# Patient Record
Sex: FEMALE | Race: WHITE | NOT HISPANIC OR LATINO | Employment: FULL TIME | URBAN - METROPOLITAN AREA
[De-identification: names, ages, dates, MRNs, and addresses within clinical notes are randomized per-mention and may not be internally consistent; named-entity substitution may affect disease eponyms.]

---

## 2017-09-14 ENCOUNTER — OFFICE VISIT (OUTPATIENT)
Dept: FAMILY MEDICINE | Facility: CLINIC | Age: 22
End: 2017-09-14
Payer: COMMERCIAL

## 2017-09-14 VITALS
TEMPERATURE: 98.2 F | BODY MASS INDEX: 20.24 KG/M2 | OXYGEN SATURATION: 100 % | HEART RATE: 60 BPM | SYSTOLIC BLOOD PRESSURE: 101 MMHG | DIASTOLIC BLOOD PRESSURE: 55 MMHG | HEIGHT: 62 IN | WEIGHT: 110 LBS

## 2017-09-14 DIAGNOSIS — Z30.40 ENCOUNTER FOR SURVEILLANCE OF CONTRACEPTIVES, UNSPECIFIED CONTRACEPTIVE: Primary | ICD-10-CM

## 2017-09-14 PROCEDURE — 99203 OFFICE O/P NEW LOW 30 MIN: CPT | Performed by: INTERNAL MEDICINE

## 2017-09-14 RX ORDER — DROSPIRENONE AND ETHINYL ESTRADIOL 0.03MG-3MG
1 KIT ORAL DAILY
Qty: 30 TABLET | Refills: 11 | Status: SHIPPED | OUTPATIENT
Start: 2017-09-14 | End: 2018-02-10

## 2017-09-14 RX ORDER — DROSPIRENONE AND ETHINYL ESTRADIOL 0.03MG-3MG
1 KIT ORAL DAILY
COMMUNITY
End: 2017-09-14

## 2017-09-14 NOTE — MR AVS SNAPSHOT
After Visit Summary   9/14/2017    Rosario Loera    MRN: [id number]           Patient Information     Date Of Birth          1995        Visit Information        Provider Department      9/14/2017 6:30 PM Chantelle Torres MD [facility]        Today's Diagnoses     Encounter for surveillance of contraceptives, unspecified contraceptive    -  1       Follow-ups after your visit        Additional Services     OB/GYN REFERRAL       Your provider has referred you to:  G: [facility] [phone]  [email or web address]    Please be aware that coverage of these services is subject to the terms and limitations of your health insurance plan.  Call member services at your health plan with any benefit or coverage questions.      Please bring the following with you to your appointment:    (1) Any X-Rays, CTs or MRIs which have been performed.  Contact the facility where they were done to arrange for  prior to your scheduled appointment.   (2) List of current medications   (3) This referral request   (4) Any documents/labs given to you for this referral                  Who to contact     If you have questions or need follow up information about today's clinic visit or your schedule please contact [facility] directly at [phone].  Normal or non-critical lab and imaging results will be communicated to you by MyChart, letter or phone within 4 business days after the clinic has received the results. If you do not hear from us within 7 days, please contact the clinic through MyChart or phone. If you have a critical or abnormal lab result, we will notify you by phone as soon as possible.  Submit refill requests through [employer] or call your pharmacy and they will forward the refill request to us. Please allow 3 business days for your refill to be completed.          Additional Information About Your Visit       "  MyChart Information     [employer] lets you send messages to your doctor, view your test results, renew your prescriptions, schedule appointments and more. To sign up, go to [email or web address] . Click on \"Log in\" on the left side of the screen, which will take you to the Welcome page. Then click on \"Sign up Now\" on the right side of the page.     You will be asked to enter the access code listed below, as well as some personal information. Please follow the directions to create your username and password.     Your access code is: [email or web address]  Expires: 2017  6:38 PM     Your access code will  in 90 days. If you need help or a new code, please call your [facility] clinic or [phone].        Care EveryWhere ID     This is your [facility] EveryWhere ID. This could be used by other organizations to access your [facility] medical records  [id number]        Your Vitals Were     Pulse Temperature Height Pulse Oximetry Breastfeeding? BMI (Body Mass Index)    60 98.2  F (36.8  C) (Tympanic) 5' 1.5\" (1.562 m) 100% No 20.45 kg/m2       Blood Pressure from Last 3 Encounters:   17 101/55    Weight from Last 3 Encounters:   17 110 lb (49.9 kg)              We Performed the Following     OB/GYN REFERRAL          Where to get your medicines      These medications were sent to [facility] [address] [place] [address] [address], [place] [address]     Phone:  [phone]     drospirenone-ethinyl estradiol 3-0.03 MG per tablet          Primary Care Provider Office Phone # Fax #    Chantelle Celestino Torres [phone]       [facility] [address] [address]        Equal Access to Services     KERA CAUSEY AH: Madie Henry, waaxda luqadaha, qaybta kaalperez dorsey, randy moreland. [facility] [phone].    ATENCIÓN: Si habla español, tiene a hearn disposición " servicios gratuitos de asistencia lingüística. Justice kendrick [phone].    We comply with applicable federal civil rights laws and [place] laws. We do not discriminate on the basis of race, color, national origin, age, disability sex, sexual orientation or gender identity.            Thank you!     Thank you for choosing [facility]  for your care. Our goal is always to provide you with excellent care. Hearing back from our patients is one way we can continue to improve our services. Please take a few minutes to complete the written survey that you may receive in the mail after your visit with us. Thank you!             Your Updated Medication List - Protect others around you: Learn how to safely use, store and throw away your medicines at www.disposemymeds.org.          This list is accurate as of: 9/14/17  6:38 PM.  Always use your most recent med list.                   Brand Name Dispense Instructions for use Diagnosis    drospirenone-ethinyl estradiol 3-0.03 MG per tablet    KRISTINA    30 tablet    Take 1 tablet by mouth daily    Encounter for surveillance of contraceptives, unspecified contraceptive       [facility] [employer] VITAMINS PO

## 2017-09-14 NOTE — PROGRESS NOTES
"Chief Complaint:     Refill of Contraceptive pills    HPI:   Patient Rosario Loera is a 21 year old female with no significant past medical history who presents to Internal Medicine clinic today for refill of contraceptive pills. Regarding the patient's routine health maintenance, the patient is also interested in a referral to establish care with outpatient ob/gyn clinic going forward.      Current Medications:     Current Outpatient Prescriptions   Medication Sig Dispense Refill     Pediatric Multivit-Minerals-C ([employer] BE[employer] VITAMINS PO)        drospirenone-ethinyl estradiol (KRISTINA) 3-0.03 MG per tablet Take 1 tablet by mouth daily 30 tablet 11     [DISCONTINUED] drospirenone-ethinyl estradiol (KRISTINA) 3-0.03 MG per tablet Take 1 tablet by mouth daily           Allergies:    No Known Allergies         Past Medical History:   No past medical history on file.      Past Surgical History:   No past surgical history on file.      Family Medical History:     Family History   Problem Relation Age of Onset     Family History Negative Mother      Family History Negative Father          Social History:     Social History     Social History     Marital status: Single     Spouse name: N/A     Number of children: N/A     Years of education: N/A     Occupational History     Not on file.     Social History Main Topics     Smoking status: Never Smoker     Smokeless tobacco: Never Used     Alcohol use Yes     Drug use: No     Sexual activity: Yes     Partners: Male     Birth control/ protection: Pill     Other Topics Concern     Not on file     Social History Narrative     No narrative on file           Review of System:      ROS: 10 point ROS neg other than the symptoms noted above in the HPI.        Physical Exam:   /55 (BP Location: Right arm, Cuff Size: Adult Small)  Pulse 60  Temp 98.2  F (36.8  C) (Tympanic)  Ht 5' 1.5\" (1.562 m)  Wt 110 lb (49.9 kg)  SpO2 100%  Breastfeeding? No  BMI 20.45 " kg/m2    GENERAL: healthy, alert and no distress  HENT: Normocephalic atraumatic.   MS: no gross musculoskeletal defects noted  NEURO: Alert & Oriented x 3.   PSYCH: mentation appears normal, affect normal        ASSESSMENT/PLAN:       (Z30.40) Encounter for surveillance of contraceptives, unspecified contraceptive  (primary encounter diagnosis)  Comment: Patient is requesting refill of contraceptive pills. Regarding the patient's routine health maintenance, the patient is also interested in a referral to establish care with outpatient ob/gyn clinic going forward.  Plan: I have ordered refill of the drospirenone-ethinyl estradiol (KRISTINA) 3-0.03 MG per tablet contraceptive pills, and OB/GYN REFERRAL to establish care with outpatient ob/gyn clinic going forward.        Follow Up Plan:     Patient is instructed to return to Internal Medicine clinic for follow-up visit on an as-needed basis.        Chantelle Torres MD  Internal Medicine  [facility]

## 2017-09-14 NOTE — NURSING NOTE
"No chief complaint on file.      Initial /55 (BP Location: Right arm, Cuff Size: Adult Small)  Pulse 60  Temp 98.2  F (36.8  C) (Tympanic)  Ht 5' 1.5\" (1.562 m)  Wt 110 lb (49.9 kg)  SpO2 100%  Breastfeeding? No  BMI 20.45 kg/m2 Estimated body mass index is 20.45 kg/(m^2) as calculated from the following:    Height as of this encounter: 5' 1.5\" (1.562 m).    Weight as of this encounter: 110 lb (49.9 kg).  Medication Reconciliation: complete  Roxy Moses MA    "

## 2017-12-07 ENCOUNTER — OFFICE VISIT (OUTPATIENT)
Dept: FAMILY MEDICINE | Facility: CLINIC | Age: 22
End: 2017-12-07
Payer: COMMERCIAL

## 2017-12-07 VITALS
TEMPERATURE: 97 F | BODY MASS INDEX: 21.09 KG/M2 | HEART RATE: 58 BPM | HEIGHT: 61 IN | OXYGEN SATURATION: 98 % | WEIGHT: 111.7 LBS | DIASTOLIC BLOOD PRESSURE: 68 MMHG | SYSTOLIC BLOOD PRESSURE: 106 MMHG

## 2017-12-07 DIAGNOSIS — Z30.02 ENCOUNTER FOR COUNSELING AND INSTRUCTION IN NATURAL FAMILY PLANNING TO AVOID PREGNANCY: Primary | ICD-10-CM

## 2017-12-07 PROCEDURE — 99214 OFFICE O/P EST MOD 30 MIN: CPT | Performed by: INTERNAL MEDICINE

## 2017-12-07 NOTE — MR AVS SNAPSHOT
"              After Visit Summary   2017    Rosario Loera    MRN: [id number]           Patient Information     Date Of Birth          1995        Visit Information        Provider Department      2017 6:00 PM Cornell Azar MD [facility]        Care Instructions    Read more about natural contraceptive methods at as Billing's Method, Withdrawal Method, Basal Temperature Method.    Follow up with OB-GYN once you start to have definite plan for conception.          Follow-ups after your visit        Who to contact     If you have questions or need follow up information about today's clinic visit or your schedule please contact [facility] directly at [phone].  Normal or non-critical lab and imaging results will be communicated to you by MyChart, letter or phone within 4 business days after the clinic has received the results. If you do not hear from us within 7 days, please contact the clinic through MyChart or phone. If you have a critical or abnormal lab result, we will notify you by phone as soon as possible.  Submit refill requests through [employer] or call your pharmacy and they will forward the refill request to us. Please allow 3 business days for your refill to be completed.          Additional Information About Your Visit        MyChart Information     [employer] lets you send messages to your doctor, view your test results, renew your prescriptions, schedule appointments and more. To sign up, go to [email or web address] . Click on \"Log in\" on the left side of the screen, which will take you to the Welcome page. Then click on \"Sign up Now\" on the right side of the page.     You will be asked to enter the access code listed below, as well as some personal information. Please follow the directions to create your username and password.     Your access code is: [email or web address]  Expires: 2017  5:38 PM     Your access code will  in 90 days. If " "you need help or a new code, please call your [facility] clinic or [phone].        Care EveryWhere ID     This is your Care EveryWhere ID. This could be used by other organizations to access your [facility] medical records  [id number]        Your Vitals Were     Pulse Temperature Height Pulse Oximetry BMI (Body Mass Index)       58 97  F (36.1  C) (Oral) 5' 1\" (1.549 m) 98% 21.11 kg/m2        Blood Pressure from Last 3 Encounters:   12/07/17 106/68   09/14/17 101/55    Weight from Last 3 Encounters:   12/07/17 111 lb 11.2 oz (50.7 kg)   09/14/17 110 lb (49.9 kg)              Today, you had the following     No orders found for display       Primary Care Provider Fax #    Physician No Ref-Primary [phone]       No address on file        Equal Access to Services     : Hadrosanna Henry, doroteo amezcua, amy realalmadoron dorsey, randy lamb . So [facility] [phone].    ATENCIÓN: Si habla español, tiene a hearn disposición servicios gratuitos de asistencia lingüística. Shandaame al [phone].    We comply with applicable federal civil rights laws and [place] laws. We do not discriminate on the basis of race, color, national origin, age, disability, sex, sexual orientation, or gender identity.            Thank you!     Thank you for choosing [facility]  for your care. Our goal is always to provide you with excellent care. Hearing back from our patients is one way we can continue to improve our services. Please take a few minutes to complete the written survey that you may receive in the mail after your visit with us. Thank you!             Your Updated Medication List - Protect others around you: Learn how to safely use, store and throw away your medicines at www.disposemymeds.org.          This list is accurate as of: 12/7/17  6:35 PM.  Always use your most recent med list.                   Brand Name Dispense Instructions for use Diagnosis    " drospirenone-ethinyl estradiol 3-0.03 MG per tablet    KRISTINA    30 tablet    Take 1 tablet by mouth daily    Encounter for surveillance of contraceptives, unspecified contraceptive       [employer] VITAMINS PO

## 2017-12-08 NOTE — PROGRESS NOTES
"HPI    SUBJECTIVE:   Rosario Loera is a 22 year old female who presents to clinic today for the following health issues:    Patient is interested in discussing methods for contraception.  She prefers to learn more about natural methods.      History reviewed. No pertinent past medical history.      Review of Systems   Constitutional: Negative for malaise/fatigue and weight loss.   Gastrointestinal: Negative for abdominal pain, nausea and vomiting.       /68 (BP Location: Left arm, Patient Position: Chair, Cuff Size: Adult Regular)  Pulse 58  Temp 97  F (36.1  C) (Oral)  Ht 5' 1\" (1.549 m)  Wt 111 lb 11.2 oz (50.7 kg)  SpO2 98%  BMI 21.11 kg/m2      Physical Exam   Constitutional: She is oriented to person, place, and time. No distress.   Neck: No thyromegaly present.   Abdominal: Soft. She exhibits no distension. There is no tenderness.   Neurological: She is alert and oriented to person, place, and time. GCS score is 15.   Psychiatric: Mood and affect normal.   Vitals reviewed.        ICD-10-CM    1. Encounter for counseling and instruction in natural family planning to avoid pregnancy Z30.02      **please refer to HPI for status of conditions      Patient Instructions   Read more about natural contraceptive methods at as Billing's Method, Withdrawal Method, Basal Temperature Method.    Follow up with OB-GYN once you start to have definite plan for conception.      *25 minutes was spent with the patient, more than half of which was spent on counseling on natural contraceptive methods    "

## 2017-12-08 NOTE — NURSING NOTE
"Chief Complaint   Patient presents with     Establish Care       Initial /68 (BP Location: Left arm, Patient Position: Chair, Cuff Size: Adult Regular)  Pulse 58  Temp 97  F (36.1  C) (Oral)  Ht 5' 1\" (1.549 m)  Wt 111 lb 11.2 oz (50.7 kg)  SpO2 98%  BMI 21.11 kg/m2 Estimated body mass index is 21.11 kg/(m^2) as calculated from the following:    Height as of this encounter: 5' 1\" (1.549 m).    Weight as of this encounter: 111 lb 11.2 oz (50.7 kg).  Medication Reconciliation: complete     Gi Cortez MA   "

## 2017-12-08 NOTE — PATIENT INSTRUCTIONS
Read more about natural contraceptive methods at as Billing's Method, Withdrawal Method, Basal Temperature Method.    Follow up with OB-GYN once you start to have definite plan for conception.

## 2017-12-29 ASSESSMENT — ENCOUNTER SYMPTOMS
NAUSEA: 0
WEIGHT LOSS: 0
VOMITING: 0
ABDOMINAL PAIN: 0

## 2018-02-10 DIAGNOSIS — Z30.40 ENCOUNTER FOR SURVEILLANCE OF CONTRACEPTIVES, UNSPECIFIED CONTRACEPTIVE: ICD-10-CM

## 2018-02-10 NOTE — TELEPHONE ENCOUNTER
"requesting 90 day supply    Requested Prescriptions   Pending Prescriptions Disp Refills     drospirenone-ethinyl estradiol (KRISTINA) 3-0.03 MG per tablet  Last Written Prescription Date:  9/14/17  Last Fill Quantity: 30 tablet,  # refills: 11   Last office visit: 12/7/2017 with prescribing provider:  Doe 9/14/17 Melissa   Future Office Visit:   84 tablet 2     Sig: Take 1 tablet by mouth daily    Contraceptives Protocol Passed    2/10/2018  9:45 AM       Passed - Patient is not a current smoker if age is 35 or older       Passed - Recent or future visit with authorizing provider's specialty    Patient had office visit in the last year or has a visit in the next 30 days with authorizing provider.  See \"Patient Info\" tab in inbasket, or \"Choose Columns\" in Meds & Orders section of the refill encounter.            Passed - No active pregnancy on record       Passed - No positive pregnancy test in past 12 months          "

## 2018-02-13 RX ORDER — DROSPIRENONE AND ETHINYL ESTRADIOL 0.03MG-3MG
1 KIT ORAL DAILY
Qty: 84 TABLET | Refills: 1 | Status: SHIPPED | OUTPATIENT
Start: 2018-02-13 | End: 2018-10-15

## 2018-10-15 DIAGNOSIS — Z30.40 ENCOUNTER FOR SURVEILLANCE OF CONTRACEPTIVES, UNSPECIFIED CONTRACEPTIVE: ICD-10-CM

## 2018-10-16 RX ORDER — DROSPIRENONE AND ETHINYL ESTRADIOL 0.03MG-3MG
1 KIT ORAL DAILY
Qty: 84 TABLET | Refills: 0 | Status: SHIPPED | OUTPATIENT
Start: 2018-10-16 | End: 2019-01-11

## 2018-10-16 NOTE — TELEPHONE ENCOUNTER
"Last Written Prescription Date:  2/13/18  Last Fill Quantity: 84 tablet,  # refills: 1   Last office visit: 12/7/2017 with prescribing provider:  Doe   Future Office Visit:      Requested Prescriptions   Pending Prescriptions Disp Refills     drospirenone-ethinyl estradiol (KRISTINA) 3-0.03 MG per tablet 84 tablet 1     Sig: Take 1 tablet by mouth daily    Contraceptives Protocol Passed    10/15/2018  6:22 PM       Passed - Patient is not a current smoker if age is 35 or older       Passed - Recent (12 mo) or future (30 days) visit within the authorizing provider's specialty    Patient had office visit in the last 12 months or has a visit in the next 30 days with authorizing provider or within the authorizing provider's specialty.  See \"Patient Info\" tab in inbasket, or \"Choose Columns\" in Meds & Orders section of the refill encounter.           Passed - No active pregnancy on record       Passed - No positive pregnancy test in past 12 months          "

## 2018-10-16 NOTE — TELEPHONE ENCOUNTER
A 90 day supply is given, patient is due for an office visit.  Please call to  assist the patient in scheduling an appointment.  Tri LINARES RN  [employer] [facility]

## 2019-01-11 DIAGNOSIS — Z30.40 ENCOUNTER FOR SURVEILLANCE OF CONTRACEPTIVES, UNSPECIFIED CONTRACEPTIVE: ICD-10-CM

## 2019-01-11 RX ORDER — DROSPIRENONE AND ETHINYL ESTRADIOL 0.03MG-3MG
1 KIT ORAL DAILY
Qty: 28 TABLET | Refills: 0 | Status: SHIPPED | OUTPATIENT
Start: 2019-01-11 | End: 2022-06-29

## 2019-01-11 NOTE — TELEPHONE ENCOUNTER
Suha already given, patient wanted to go to OB/GYN for OV.     OV with Rick 1/8/19 was cancelled.    LOV 12-7-17 Doe (est care)    RT David (R)

## 2021-07-25 ENCOUNTER — HOSPITAL ENCOUNTER (EMERGENCY)
Facility: CLINIC | Age: 26
Discharge: HOME | End: 2021-07-26
Attending: EMERGENCY MEDICINE | Admitting: EMERGENCY MEDICINE
Payer: COMMERCIAL

## 2021-07-25 ENCOUNTER — OFFICE VISIT (OUTPATIENT)
Dept: URGENT CARE | Facility: URGENT CARE | Age: 26
End: 2021-07-25
Payer: COMMERCIAL

## 2021-07-25 VITALS
DIASTOLIC BLOOD PRESSURE: 58 MMHG | RESPIRATION RATE: 18 BRPM | WEIGHT: 123 LBS | BODY MASS INDEX: 23.24 KG/M2 | TEMPERATURE: 98.8 F | HEART RATE: 82 BPM | SYSTOLIC BLOOD PRESSURE: 102 MMHG | OXYGEN SATURATION: 100 %

## 2021-07-25 VITALS
BODY MASS INDEX: 23.24 KG/M2 | OXYGEN SATURATION: 100 % | TEMPERATURE: 99.5 F | HEART RATE: 81 BPM | DIASTOLIC BLOOD PRESSURE: 60 MMHG | WEIGHT: 123 LBS | RESPIRATION RATE: 20 BRPM | SYSTOLIC BLOOD PRESSURE: 116 MMHG

## 2021-07-25 DIAGNOSIS — Z34.90 PREGNANCY, UNSPECIFIED GESTATIONAL AGE: ICD-10-CM

## 2021-07-25 DIAGNOSIS — R11.2 NAUSEA AND VOMITING, INTRACTABILITY OF VOMITING NOT SPECIFIED, UNSPECIFIED VOMITING TYPE: ICD-10-CM

## 2021-07-25 DIAGNOSIS — K52.9 GASTROENTERITIS: Primary | ICD-10-CM

## 2021-07-25 DIAGNOSIS — E87.6 HYPOKALEMIA: ICD-10-CM

## 2021-07-25 LAB
ANION GAP SERPL CALCULATED.3IONS-SCNC: 8 MMOL/L (ref 3–14)
BUN SERPL-MCNC: 8 MG/DL (ref 7–30)
CALCIUM SERPL-MCNC: 8.6 MG/DL (ref 8.5–10.1)
CHLORIDE BLD-SCNC: 104 MMOL/L (ref 94–109)
CO2 SERPL-SCNC: 23 MMOL/L (ref 20–32)
CREAT SERPL-MCNC: 0.71 MG/DL (ref 0.52–1.04)
ERYTHROCYTE [DISTWIDTH] IN BLOOD BY AUTOMATED COUNT: 12.7 % (ref 10–15)
GFR SERPL CREATININE-BSD FRML MDRD: >90 ML/MIN/1.73M2
GLUCOSE BLD-MCNC: 74 MG/DL (ref 70–99)
HCT VFR BLD AUTO: 34.5 % (ref 35–47)
HGB BLD-MCNC: 11.6 G/DL (ref 11.7–15.7)
MCH RBC QN AUTO: 32.8 PG (ref 26.5–33)
MCHC RBC AUTO-ENTMCNC: 33.6 G/DL (ref 31.5–36.5)
MCV RBC AUTO: 98 FL (ref 78–100)
PLATELET # BLD AUTO: 233 10E3/UL (ref 150–450)
POTASSIUM BLD-SCNC: 3.1 MMOL/L (ref 3.4–5.3)
RBC # BLD AUTO: 3.54 10E6/UL (ref 3.8–5.2)
SODIUM SERPL-SCNC: 135 MMOL/L (ref 133–144)
WBC # BLD AUTO: 11.7 10E3/UL (ref 4–11)

## 2021-07-25 PROCEDURE — 36415 COLL VENOUS BLD VENIPUNCTURE: CPT | Performed by: NURSE PRACTITIONER

## 2021-07-25 PROCEDURE — 250N000013 HC RX MED GY IP 250 OP 250 PS 637: Performed by: EMERGENCY MEDICINE

## 2021-07-25 PROCEDURE — 80048 BASIC METABOLIC PNL TOTAL CA: CPT | Performed by: NURSE PRACTITIONER

## 2021-07-25 PROCEDURE — 99203 OFFICE O/P NEW LOW 30 MIN: CPT | Performed by: FAMILY MEDICINE

## 2021-07-25 PROCEDURE — 250N000011 HC RX IP 250 OP 636: Performed by: NURSE PRACTITIONER

## 2021-07-25 PROCEDURE — 85027 COMPLETE CBC AUTOMATED: CPT | Performed by: NURSE PRACTITIONER

## 2021-07-25 PROCEDURE — 96374 THER/PROPH/DIAG INJ IV PUSH: CPT

## 2021-07-25 PROCEDURE — 99284 EMERGENCY DEPT VISIT MOD MDM: CPT | Mod: 25

## 2021-07-25 RX ORDER — POTASSIUM CHLORIDE 1.5 G/1.58G
20 POWDER, FOR SOLUTION ORAL ONCE
Status: DISCONTINUED | OUTPATIENT
Start: 2021-07-25 | End: 2021-07-25

## 2021-07-25 RX ORDER — ONDANSETRON 2 MG/ML
4 INJECTION INTRAMUSCULAR; INTRAVENOUS ONCE
Status: COMPLETED | OUTPATIENT
Start: 2021-07-25 | End: 2021-07-25

## 2021-07-25 RX ORDER — ONDANSETRON 4 MG/1
4 TABLET, ORALLY DISINTEGRATING ORAL EVERY 8 HOURS PRN
Qty: 12 TABLET | Refills: 0 | Status: SHIPPED | OUTPATIENT
Start: 2021-07-25 | End: 2021-07-29

## 2021-07-25 RX ORDER — POTASSIUM CHLORIDE 1500 MG/1
20 TABLET, EXTENDED RELEASE ORAL ONCE
Status: COMPLETED | OUTPATIENT
Start: 2021-07-25 | End: 2021-07-25

## 2021-07-25 RX ADMIN — POTASSIUM CHLORIDE 20 MEQ: 1500 TABLET, EXTENDED RELEASE ORAL at 23:54

## 2021-07-25 RX ADMIN — ONDANSETRON 4 MG: 2 INJECTION INTRAMUSCULAR; INTRAVENOUS at 22:39

## 2021-07-25 ASSESSMENT — ENCOUNTER SYMPTOMS
SORE THROAT: 0
HEADACHES: 0
SHORTNESS OF BREATH: 0
VOMITING: 1
DIARRHEA: 1

## 2021-07-25 NOTE — PROGRESS NOTES
Rosario Esaley is a 25 year old female who comes in today for symptoms since midnight last night    Nausea and vomiting    Lots of vomiting    Not able to drink much of anything    7 months pregnant     Due in 2 months    Pregnancy gone well thus far    1st trimester had some nausea but none since then     Feels hot    Stomach hurts    womens center at [facility] is  Where patient is going for pregnancy    At cabin last night    At least half the group got nausea/ vomiting/ diarrhea      Full physical not done     Mentation and affect are fine    No tremor of speech or extremity  [  Heart and lungs fine  [  No edema    Radial pulses okay    Patient gravid    ASSESSMENT / PLAN:  (K52.9) Gastroenteritis  (primary encounter diagnosis)  Comment: patient not able to drink much of anything today; likely dehydrated which is concerning given 7 months pregnant  Plan: discussed in detail ; to [facility] for eval/  Monitoring/ likely iv fluids     (Z34.90) Pregnancy, unspecified gestational age  Comment: as eryn   Plan: as above     [facility] ob group is affiliated with      to take patient there       I reviewed the patient's medications, allergies, medical history, family history, and social history.    Pedro Pablo Alamo MD

## 2021-07-26 NOTE — ED NOTES
"L&D nurse assessed baby in pt room. Reported baby \"looks good\". No contractions noted, HR WNL for gestation.   "

## 2021-07-26 NOTE — ED TRIAGE NOTES
Pt here with family for N/V. Pt states she developed nausea and vomiting last night. She states she was at a cabin this week with someone who was having the same symptoms. Pt states the cabins water is cleaned and filtered. Denies diarrhea. Pt states she is 7 months pregnant. MAC stated pt could be seen in ED and they would come to ED to assess as needed. Mother states baby is still moving frequently. No vaginal bleeding or abd pain.

## 2021-07-26 NOTE — ED PROVIDER NOTES
History   Chief Complaint:  Vomiting       The history is provided by the patient and the spouse.      Rosario Easley is a 25 year old female 7 months pregnant who presents with vomiting. Symptoms began last [place]. Nine other visitors at the cabin also shared the same symptoms while got similar symptoms from a toddler who picked up a GI bug from . Notes diarrhea. Denies vaginal bleeding, chest pain, headaches, sore throat, and vision changes.    Review of Systems   HENT: Negative for sore throat.    Eyes: Negative for visual disturbance.   Respiratory: Negative for shortness of breath.    Cardiovascular: Negative for chest pain.   Gastrointestinal: Positive for diarrhea and vomiting.   Genitourinary: Negative for vaginal bleeding.   Neurological: Negative for headaches.   All other systems reviewed and are negative.    Allergies:  No Known Allergies    Medications:  None     Past Medical History:    Raynaud's disease without gangrene    Past Surgical History:    [place] teeth extraction     Family History:    Father: depression   Mother: hypertension     Social History:  PCP:Clinic, [facility]  Presents with , Ricky.    Physical Exam     Patient Vitals for the past 24 hrs:   BP Temp Temp src Pulse Resp SpO2 Weight   07/25/21 1959 102/58 98.8  F (37.1  C) Oral 82 18 100 % 55.8 kg (123 lb)       Physical Exam    Physical Exam   General:  Sitting on bed with  at bedside.   HENT:  No obvious trauma to head  Right Ear:  External ear normal.   Left Ear:  External ear normal.   Nose:  Nose normal.   Eyes:  Conjunctivae and EOM are normal. Pupils are equal, round, and reactive.   Neck: Normal range of motion. Neck supple. No tracheal deviation present.   CV:  Normal heart sounds. No murmur heard.  Pulm/Chest: Effort normal and breath sounds normal.   Abd: Gravid uterus appropriate for gestation. Negative Mcburney's sign. Soft. No distension. There is no  tenderness. There is no rigidity, no rebound and no guarding.   M/S: Normal range of motion.   Neuro: Alert. GCS 15.  Skin: Skin is warm and dry. No rash noted. Not diaphoretic.   Psych: Normal mood and affect. Behavior is normal.   Negative Mcburney's sign.      Emergency Department Course     Laboratory:  CBC: WBC 11.7 (H), HGB 11.6 (L) ,    BMP: Potassium 3.1 (L), Creatinine 0.71 o/w WNL    Emergency Department Course:    Reviewed:  I reviewed nursing notes, vitals, past medical history and care everywhere    Assessments:  2250 I obtained history and examined the patient as noted above.     Interventions:  2239 Zofran 4 mg IV   2354 KLOR-CON M 40mEq Oral     Disposition:  The patient was discharged to home.     Impression & Plan   Medical Decision Making:  Rosario Easley is a very pleasant 25 year old female who presents for evaluation of nausea, vomiting and diarrhea with minimal abdominal pain in a nonfocal abdominal exam. The differential diagnosis of vomiting and diarrhea is broad and includes such etiologies as viral gastroenteritis, bacterial infection of the large intestine (salmonella, shigella, campylobacter, e coli, etc), bowel obstruction, intra-abdominal infection such as colitis, cholecystitis, UTI, pyelonephritis, appendicitis, etc.  There are no signs of worrisome intra-abdominal pathologies detected during the visit today.  The patient has a completely benign abdominal exam without rebound, guarding, or marked tenderness to palpation.  Laboratory studies were unremarkable other than the hypokalemia which was replaced here.  The patient improved with iv fluids and anti-emetics.  I reviewed the risk and benefit of Zofran in pregnancy and she consented for it.  Supportive outpatient management is therefore indicated.  Abdominal pain precautions are given for home.   No indication for stool studies at this time.  No indication for CT at this time.  It was discussed with the patient to return  to the ED for blood in stool, increasing pain, or fevers more than 102.   Feels much improved after interventions in ED.     The treatment plan was discussed with the patient and they expressed understanding of this plan and consented to the plan.  In addition, the patient will return to the emergency department if their symptoms persist, worsen, if new symptoms arise or if there is any concern as other pathology may be present that is not evident at this time. They also understand the importance of close follow up in the clinic and if unable to do so will return to the emergency department for a reevaluation. All questions were answered.    Covid-19  Rosario Easley was evaluated during a global COVID-19 pandemic, which necessitated consideration that the patient might be at risk for infection with the SARS-CoV-2 virus that causes COVID-19.   Applicable protocols for evaluation were followed during the patient's care.       Diagnosis:    ICD-10-CM    1. Nausea and vomiting, intractability of vomiting not specified, unspecified vomiting type  R11.2        Discharge Medications:  Discharge Medication List as of 7/26/2021 12:26 AM      START taking these medications    Details   ondansetron (ZOFRAN ODT) 4 MG ODT tab Take 1 tablet (4 mg) by mouth every 8 hours as needed for nausea, Disp-12 tablet, R-0, E-Prescribe             Scribe Disclosure:  Damon CONNER, am serving as a scribe at 10:26 PM on 7/25/2021 to document services personally performed by Seun Easley DO based on my observations and the provider's statements to me.      Seun Easley DO  07/26/21 0105

## 2022-06-29 ENCOUNTER — OFFICE VISIT (OUTPATIENT)
Dept: FAMILY MEDICINE | Facility: CLINIC | Age: 27
End: 2022-06-29

## 2022-06-29 VITALS
WEIGHT: 105 LBS | BODY MASS INDEX: 19.83 KG/M2 | HEIGHT: 61 IN | SYSTOLIC BLOOD PRESSURE: 100 MMHG | HEART RATE: 57 BPM | RESPIRATION RATE: 16 BRPM | DIASTOLIC BLOOD PRESSURE: 62 MMHG | OXYGEN SATURATION: 98 %

## 2022-06-29 DIAGNOSIS — I73.00 RAYNAUD'S DISEASE WITHOUT GANGRENE: Primary | ICD-10-CM

## 2022-06-29 DIAGNOSIS — R20.0 NUMBNESS OF TOES: ICD-10-CM

## 2022-06-29 DIAGNOSIS — D48.5 NEOPLASM OF UNCERTAIN BEHAVIOR OF SKIN: ICD-10-CM

## 2022-06-29 LAB
% GRANULOCYTES: 66.6 % (ref 42.2–75.2)
HCT VFR BLD AUTO: 39 % (ref 35–46)
HEMOGLOBIN: 13.6 G/DL (ref 11.8–15.5)
LYMPHOCYTES NFR BLD AUTO: 28.1 % (ref 20.5–51.1)
MCH RBC QN AUTO: 31.3 PG (ref 27–31)
MCHC RBC AUTO-ENTMCNC: 34.8 G/DL (ref 33–37)
MCV RBC AUTO: 89.8 FL (ref 80–100)
MONOCYTES NFR BLD AUTO: 5.3 % (ref 1.7–9.3)
PLATELET # BLD AUTO: 257 K/UL (ref 140–450)
RBC # BLD AUTO: 4.34 X10/CMM (ref 3.7–5.2)
WBC # BLD AUTO: 6.4 X10/CMM (ref 3.8–11)

## 2022-06-29 PROCEDURE — 11102 TANGNTL BX SKIN SINGLE LES: CPT | Performed by: NURSE PRACTITIONER

## 2022-06-29 PROCEDURE — 36415 COLL VENOUS BLD VENIPUNCTURE: CPT | Performed by: NURSE PRACTITIONER

## 2022-06-29 PROCEDURE — 85025 COMPLETE CBC W/AUTO DIFF WBC: CPT | Performed by: NURSE PRACTITIONER

## 2022-06-29 PROCEDURE — 99203 OFFICE O/P NEW LOW 30 MIN: CPT | Mod: 25 | Performed by: NURSE PRACTITIONER

## 2022-06-29 RX ORDER — ACETAMINOPHEN AND CODEINE PHOSPHATE 120; 12 MG/5ML; MG/5ML
0.35 SOLUTION ORAL DAILY
COMMUNITY
Start: 2022-06-10

## 2022-06-29 NOTE — LETTER
Ambulated patient on 6L patient dropped to 84%, recovered to 88% while resting at 6L [facility] [address] [place]  [address]  Phone: [phone]    July 1, 2022      Rosario Easley  [address] [place] [id number]            Dear Rosario,     It was so nice to meet you at your recent appointment!   All of your lab results look great! I will call you with the skin biopsy results when I get them.   Normal complete blood count which includes a white count (WBC), hemoglobin (hgb), and platelets.  Minor abnormalities noted are of no concern.   Normal ferritin which is a marker for bone marrow iron stores   Vitamin B12 & folate levels are normal.   Normal glucose level indicating no diabetes or prediabetes.   Normal kidney and liver function and electrolytes (complete metabolic panel) with minor abnormalities of no concern to you.   Normal thyroid testing (TSH, free T4)   I think that numbness is due to your Raynauds with lab testing being normal.     Please let us know if you have questions or concerns.     Sincerely,   CHEIKH Cesar CNP         Results for orders placed or performed in visit on 06/29/22   CBC with Diff/Plt ([facility])     Status: Abnormal   Result Value Ref Range    WBC x10/cmm 6.4 3.8 - 11.0 x10/cmm    % Lymphocytes 28.1 20.5 - 51.1 %    % Monocytes 5.3 1.7 - 9.3 %    % Granulocytes 66.6 42.2 - 75.2 %    RBC x10/cmm 4.34 3.7 - 5.2 x10/cmm    Hemoglobin 13.6 11.8 - 15.5 g/dl    Hematocrit 39.0 35 - 46 %    MCV 89.8 80 - 100 fL    MCH 31.3 (A) 27.0 - 31.0 pg    MCHC 34.8 33.0 - 37.0 g/dL    Platelet Count 257 140 - 450 K/uL   Ferritin  Serum ([facility])     Status: None   Result Value Ref Range    Ferritin 78 15 - 150 ng/mL    Narrative    Performed at:  01 - [facility] [address], [place], [place]  [id number]  : Jefferson Harrell MD, Phone:  [phone]   Vitamin B12 and Folate ([facility])     Status: None   Result Value Ref Range    Vitamin B12 426 232 - 1,245 pg/mL    Folate >20.0 >3.0 ng/mL    Narrative    Performed at:  [facility]  [facility] [address], [place], [place]  [id number]  : Jefferson Harerll MD, Phone:  [phone]   Comp. Metabolic Panel (14) ([facility])     Status: Abnormal   Result Value Ref Range    Glucose 81 65 - 99 mg/dL    Urea Nitrogen 13 6 - 20 mg/dL    Creatinine 0.83 0.57 - 1.00 mg/dL    eGFR  98 >59 mL/min/1.73    BUN/Creatinine Ratio 16 9 - 23    Sodium 142 134 - 144 mmol/L    Potassium 4.0 3.5 - 5.2 mmol/L    Chloride 106 96 - 106 mmol/L    Total CO2 24 20 - 29 mmol/L    Calcium 9.1 8.7 - 10.2 mg/dL    Protein Total 6.5 6.0 - 8.5 g/dL    Albumin 4.5 3.9 - 5.0 g/dL    Globulin, Total 2.0 1.5 - 4.5 g/dL    A/G Ratio 2.3 (H) 1.2 - 2.2    Bilirubin Total 0.3 0.0 - 1.2 mg/dL    Alkaline Phosphatase 64 44 - 121 IU/L    AST 22 0 - 40 IU/L    ALT 19 0 - 32 IU/L    Narrative    Performed at:  01 - [facility] [address], [place], [place]  [id number]  : Jefferson Harrell MD, Phone:  [phone]   TSH ([facility])     Status: None   Result Value Ref Range    TSH 1.780 0.450 - 4.500 uIU/mL    Narrative    Performed at:  01 - [facility] [address], [place], [place]  [id number]  : Jefferson Harrell MD, Phone:  [phone]   Thyroxine (T4) Free  Direct  S ([facility])     Status: None   Result Value Ref Range    T4 Free 1.22 0.82 - 1.77 ng/dL    Narrative    Performed at:  01 - [facility] [address], [place], [place]  [id number]  : Jefferson Harrell MD, Phone:  [phone]   Pathology Report ([facility])     Status: None   Result Value Ref Range    . Comment     Clinician Provided ICD10 Comment     . Comment     . Comment     . Comment     . Comment     Pathologist Provided ICD10 Comment     . Comment     Narrative    Performed at:  01 - [address]  [id number]  : Daron Khoury MD, Phone:  [phone]

## 2022-06-29 NOTE — PROGRESS NOTES
"Problem(s) Oriented visit        SUBJECTIVE:                                                    Rosario Easley is a 26 year old female who presents to clinic today for the following health issues :    New patient to clinic. Previously seen by OB/GYN. Daughter, Jesenia, born 9/2021. No longer breastfeeding. Continues to take Norethindrone oral contraceptive. Thinking about starting to try getting pregnant again in near future.     Has Raynaud's without gangrene most bothersome in her toes. Remembers having symptoms since she was younger. More bothersome last couple years. 2 sauceda ago felt as though toes were more red and itchy at times. Same this past winter. Used OTC athlete's foot cream but unsure if helped. More numbness in toes especially 3 smaller toes on each foot.     Mole check on back. One mole left side mid back has changed color and size. No personal or family history of skin cancer.     Problem list, Medication list, Allergies, and Medical/Social/Surgical histories reviewed in [facility] and updated as appropriate.   Additional history: as documented    ROS:  7 point ROS completed and negative except noted above, including Gen, CV, Resp, GI, MS, Neuro, Skin    OBJECTIVE:                                                    /62   Pulse 57   Resp 16   Ht 1.556 m (5' 1.25\")   Wt 47.6 kg (105 lb)   LMP 06/27/2022 (Exact Date)   SpO2 98%   Breastfeeding No   BMI 19.68 kg/m    Body mass index is 19.68 kg/m .   GENERAL: healthy, alert and no distress  RESP: calm regular breathing, no cough  CV: bradycardia, no edema  MS: bunions bilateral great toes worse on left side. All toes with erythema at tips  SKIN: left side mid back with raised light/dark brown nevus measuring 0.3 cm diameter  NEURO: grossly intact  PSYCH: normal affect & mood     ASSESSMENT/PLAN:                                                      Rosario was seen today for establish care, derm problem and numbness.    Diagnoses and all orders for " this visit:    Raynaud's disease without gangrene  Numbness of toes  -     CBC with Diff/Plt ([facility])  -     Ferritin  Serum ([facility])  -     Vitamin B12 and Folate ([facility])  -     Comp. Metabolic Panel (14) ([facility])  -     TSH ([facility])  -     Thyroxine (T4) Free  Direct  S ([facility])  -     VENOUS COLLECTION  Ruling out other causes of worsening numbness in feet. Raynaud's discussed including conservative measures - keeping extremity warm, preventing frostbite, etc. Treatment with calcium channel blockers discussed but not started at this time    Neoplasm of uncertain behavior of skin  -     SC TANGENTIAL BIOPSY OF SKIN, FIRST/SINGLE LESION  -     Pathology Report ([facility])  -     VENOUS COLLECTION  Shave Excision Procedure Note  The procedure, risks and complications, which include but are not limited to bleeding, infection were discussed. Written consent was obtained for the procedure.   Lesion was cleansed with iodine. 2 mL of  2% lidocaine with epinephrine used for subcutaneous anesthesia. Lesion was removed using Dermablade.The patient tolerated the procedure well. Hemostasis with silver nitrate. Covered with vaseline and band aid    Lesion was sent for pathology.    CHEIKH Cesar CNP  [facility]  [facility]  [phone]    For any issues my office # is [phone]

## 2022-06-29 NOTE — LETTER
[facility] [address] [place]  [address]  Phone: [phone]    July 1, 2022      Rosario Easley  [address] [place] [id number]            Dear Rosario,     The biopsy of the mole on your back came back showing irritated nevus. No concerning findings with this. No need for further follow-up regarding this skin lesion.     Please let us know if you have questions or concerns.     Sincerely,   CHEIKH Cesar CNP      Results for orders placed or performed in visit on 06/29/22   CBC with Diff/Plt ([facility])     Status: Abnormal   Result Value Ref Range    WBC x10/cmm 6.4 3.8 - 11.0 x10/cmm    % Lymphocytes 28.1 20.5 - 51.1 %    % Monocytes 5.3 1.7 - 9.3 %    % Granulocytes 66.6 42.2 - 75.2 %    RBC x10/cmm 4.34 3.7 - 5.2 x10/cmm    Hemoglobin 13.6 11.8 - 15.5 g/dl    Hematocrit 39.0 35 - 46 %    MCV 89.8 80 - 100 fL    MCH 31.3 (A) 27.0 - 31.0 pg    MCHC 34.8 33.0 - 37.0 g/dL    Platelet Count 257 140 - 450 K/uL   Ferritin  Serum ([facility])     Status: None   Result Value Ref Range    Ferritin 78 15 - 150 ng/mL    Narrative    Performed at:  01 - [facility] [address], [place], [place]  [id number]  : Jefferson Harrell MD, Phone:  [phone]   Vitamin B12 and Folate ([facility])     Status: None   Result Value Ref Range    Vitamin B12 426 232 - 1,245 pg/mL    Folate >20.0 >3.0 ng/mL    Narrative    Performed at:  01 - [facility] [address], [place], [place]  [id number]  : Jefferson Harrell MD, Phone:  [phone]   Comp. Metabolic Panel (14) ([facility])     Status: Abnormal   Result Value Ref Range    Glucose 81 65 - 99 mg/dL    Urea Nitrogen 13 6 - 20 mg/dL    Creatinine 0.83 0.57 - 1.00 mg/dL    eGFR  98 >59 mL/min/1.73    BUN/Creatinine Ratio 16 9 - 23    Sodium 142 134 - 144 mmol/L    Potassium 4.0 3.5 - 5.2 mmol/L    Chloride 106 96 - 106 mmol/L    Total CO2 24 20 - 29 mmol/L    Calcium 9.1 8.7 - 10.2 mg/dL    Protein Total 6.5 6.0 - 8.5 g/dL    Albumin  4.5 3.9 - 5.0 g/dL    Globulin, Total 2.0 1.5 - 4.5 g/dL    A/G Ratio 2.3 (H) 1.2 - 2.2    Bilirubin Total 0.3 0.0 - 1.2 mg/dL    Alkaline Phosphatase 64 44 - 121 IU/L    AST 22 0 - 40 IU/L    ALT 19 0 - 32 IU/L    Narrative    Performed at:  01 - [facility] [address], [place], [place]  [id number]  : Jefferson Harrell MD, Phone:  [phone]   TSH ([facility])     Status: None   Result Value Ref Range    TSH 1.780 0.450 - 4.500 uIU/mL    Narrative    Performed at:  01 - [facility] [address], [place], [place]  [id number]  : Jefferson Harrell MD, Phone:  [phone]   Thyroxine (T4) Free  Direct  S ([facility])     Status: None   Result Value Ref Range    T4 Free 1.22 0.82 - 1.77 ng/dL    Narrative    Performed at:  01 - [facility] [address], [place], [place]  [id number]  : Jefferson Harrell MD, Phone:  [phone]   Pathology Report ([facility])     Status: None   Result Value Ref Range    . Comment     Clinician Provided ICD10 Comment     . Comment     . Comment     . Comment     . Comment     Pathologist Provided ICD10 Comment     . Comment     Narrative    Performed at:  01 - [facility]  [address]  [id number]  : Daron Khoury MD, Phone:  [phone]

## 2022-06-30 LAB
ALBUMIN SERPL-MCNC: 4.5 G/DL (ref 3.9–5)
ALBUMIN/GLOB SERPL: 2.3 {RATIO} (ref 1.2–2.2)
ALP SERPL-CCNC: 64 IU/L (ref 44–121)
ALT SERPL-CCNC: 19 IU/L (ref 0–32)
AST SERPL-CCNC: 22 IU/L (ref 0–40)
BILIRUB SERPL-MCNC: 0.3 MG/DL (ref 0–1.2)
BUN SERPL-MCNC: 13 MG/DL (ref 6–20)
BUN/CREATININE RATIO: 16 (ref 9–23)
CALCIUM SERPL-MCNC: 9.1 MG/DL (ref 8.7–10.2)
CHLORIDE SERPLBLD-SCNC: 106 MMOL/L (ref 96–106)
CREAT SERPL-MCNC: 0.83 MG/DL (ref 0.57–1)
EGFR: 98 ML/MIN/1.73
FERRITIN SERPL-MCNC: 78 NG/ML (ref 15–150)
FOLATE: >20 NG/ML
GLOBULIN, TOTAL: 2 G/DL (ref 1.5–4.5)
GLUCOSE SERPL-MCNC: 81 MG/DL (ref 65–99)
POTASSIUM SERPL-SCNC: 4 MMOL/L (ref 3.5–5.2)
PROT SERPL-MCNC: 6.5 G/DL (ref 6–8.5)
SODIUM SERPL-SCNC: 142 MMOL/L (ref 134–144)
T4 FREE SERPL-MCNC: 1.22 NG/DL (ref 0.82–1.77)
TOTAL CO2: 24 MMOL/L (ref 20–29)
TSH BLD-ACNC: 1.78 UIU/ML (ref 0.45–4.5)
VIT B12 SERPL-MCNC: 426 PG/ML (ref 232–1245)

## 2022-07-01 LAB
.: NORMAL
CLINICIAN PROVIDED ICD10: NORMAL
PATHOLOGIST PROVIDED ICD10: NORMAL

## 2022-09-17 ENCOUNTER — HEALTH MAINTENANCE LETTER (OUTPATIENT)
Age: 27
End: 2022-09-17

## 2022-09-29 ENCOUNTER — OFFICE VISIT (OUTPATIENT)
Dept: FAMILY MEDICINE | Facility: CLINIC | Age: 27
End: 2022-09-29

## 2022-09-29 VITALS
HEIGHT: 61 IN | SYSTOLIC BLOOD PRESSURE: 102 MMHG | BODY MASS INDEX: 19.83 KG/M2 | DIASTOLIC BLOOD PRESSURE: 60 MMHG | TEMPERATURE: 97.8 F | WEIGHT: 105 LBS | HEART RATE: 66 BPM | OXYGEN SATURATION: 99 % | RESPIRATION RATE: 16 BRPM

## 2022-09-29 DIAGNOSIS — Z12.4 SCREENING FOR MALIGNANT NEOPLASM OF CERVIX: ICD-10-CM

## 2022-09-29 DIAGNOSIS — R39.9 LOWER URINARY TRACT SYMPTOMS (LUTS): ICD-10-CM

## 2022-09-29 DIAGNOSIS — Z00.00 ROUTINE GENERAL MEDICAL EXAMINATION AT A HEALTH CARE FACILITY: Primary | ICD-10-CM

## 2022-09-29 LAB
BACTERIA (RMG): ABNORMAL
BILIRUBIN (RMG): NEGATIVE
BLOOD (RMG): NEGATIVE
CASTS (RMG): ABNORMAL
CLUE CELLS: NEGATIVE
COLOR UR: ABNORMAL
CRYSTAL (RMG): ABNORMAL
EPITHELIAL (RMG): ABNORMAL
GLUCOSE (RMG): NEGATIVE
KETONE (RMG): NEGATIVE
LEUKOCYTE (RMG): NEGATIVE
MUCOUS (RMG): ABNORMAL
NITRITE (RMG): NEGATIVE
PH UR STRIP: 6 PH (ref 5–9)
PROTEIN (RMG): NEGATIVE
RBC (RMG): ABNORMAL
SP GR UR STRIP: 1 (ref 1–1.02)
TRICHOMONAS (WET PREP): NEGATIVE
UROBILINOGEN (RMG): 0.2
WBC (RMG): ABNORMAL
YEAST (WET PREP): NEGATIVE

## 2022-09-29 PROCEDURE — 87210 SMEAR WET MOUNT SALINE/INK: CPT | Performed by: NURSE PRACTITIONER

## 2022-09-29 PROCEDURE — 81003 URINALYSIS AUTO W/O SCOPE: CPT | Performed by: NURSE PRACTITIONER

## 2022-09-29 PROCEDURE — 99395 PREV VISIT EST AGE 18-39: CPT | Performed by: NURSE PRACTITIONER

## 2022-09-29 RX ORDER — NITROFURANTOIN 25; 75 MG/1; MG/1
100 CAPSULE ORAL 2 TIMES DAILY
Qty: 10 CAPSULE | Refills: 0 | Status: SHIPPED | OUTPATIENT
Start: 2022-09-29 | End: 2022-10-04

## 2022-09-29 NOTE — PROGRESS NOTES
SUBJECTIVE:   CC: Rosario Easley is an 26 year old woman who presents for preventive health visit.     Patient has been advised of split billing requirements and indicates understanding: Yes     Healthy Habits:    Do you get at least three servings of calcium containing foods daily (dairy, green leafy vegetables, etc.)? yes    Amount of exercise or daily activities, outside of work: 6 day(s) per week    Problems taking medications regularly No    Medication side effects: No    Have you had an eye exam in the past two years? no    Do you see a dentist twice per year? yes    Do you have sleep apnea, excessive snoring or daytime drowsiness?no    Dysuria & urinary frequency symptoms started about 1 week ago. Have come and gone a couple times. Feels like possible UTI to her. Denies fever, chills, hematuria, vaginal discharge or itching, abdominal pain, flank pain, nausea.    First period since having her child started 8/25/2022 and lasted about one month. Taking norethindrone oral contraceptive. Daughter 1 year old. Stopped breastfeeding in April. Period was not heavy or painful, just irritating.     Today's PHQ-2 Score:   PHQ-2 ( 1999 [employer]) 9/29/2022 9/14/2017   Q1: Little interest or pleasure in doing things 1 0   Q2: Feeling down, depressed or hopeless 0 0   PHQ-2 Score 1 0     Abuse: Current or Past(Physical, Sexual or Emotional)- No  Do you feel safe in your environment? Yes    Have you ever done Advance Care Planning? (For example, a Health Directive, POLST, or a discussion with a medical provider or your loved ones about your wishes): No, advance care planning information given to patient to review.  Patient declined advance care planning discussion at this time.    Social History     Tobacco Use     Smoking status: Never Smoker     Smokeless tobacco: Never Used   Substance Use Topics     Alcohol use: Yes     If you drink alcohol do you typically have >3 drinks per day or >7 drinks per week? No                  "    Reviewed orders with patient.  Reviewed health maintenance and updated orders accordingly - Yes  Labs reviewed in EPIC    History of abnormal Pap smear: NO - age 21-29 PAP every 3 years recommended    ROS:  CONSTITUTIONAL: NEGATIVE for fever, chills, change in weight  INTEGUMENTARU/SKIN: NEGATIVE for worrisome rashes, moles or lesions  EYES: NEGATIVE for vision changes or irritation  ENT: NEGATIVE for ear, mouth and throat problems  RESP: NEGATIVE for significant cough or SOB  BREAST: NEGATIVE for masses, tenderness or discharge  CV: NEGATIVE for chest pain, palpitations or peripheral edema  GI: NEGATIVE for nausea, abdominal pain, heartburn, or change in bowel habits   female: abnormal period  MUSCULOSKELETAL: NEGATIVE for significant arthralgias or myalgia  NEURO: NEGATIVE for weakness, dizziness or paresthesias  HEME/ALLERGY/IMMUNE: NEGATIVE for bleeding problems  PSYCHIATRIC: NEGATIVE for changes in mood or affect    OBJECTIVE:   /60   Pulse 66   Temp 97.8  F (36.6  C)   Resp 16   Ht 1.556 m (5' 1.25\")   Wt 47.6 kg (105 lb)   LMP 08/25/2022 (Approximate)   SpO2 99%   BMI 19.68 kg/m    EXAM:  GENERAL: healthy, alert and no distress  EYES: Eyes grossly normal to inspection, PERRL and conjunctivae and sclerae normal  HENT: ear canals and TM's normal, nose and mouth without ulcers or lesions  NECK: no adenopathy, no asymmetry, masses, or scars and thyroid normal to palpation  RESP: lungs clear to auscultation - no rales, rhonchi or wheezes  BREAST: normal without masses, tenderness or nipple discharge and no palpable axillary masses or adenopathy  CV: regular rate and rhythm, normal S1 S2, no S3 or S4, no murmur, click or rub, no peripheral edema and peripheral pulses strong  ABDOMEN: soft, nontender, no hepatosplenomegaly, no masses and bowel sounds normal   (female): normal female external genitalia, normal urethral meatus , vaginal mucosa pink, moist, well rugated and normal cervix, adnexae, " "and uterus without masses.  MS: no gross musculoskeletal defects noted, no edema  SKIN: no suspicious lesions or rashes  NEURO: Normal strength and tone, mentation intact and speech normal  PSYCH: normal affect & mood    Wet prep: normal    UA RESULTS:  Recent Labs   Lab Test 09/29/22  1017   COLOR Straw*   SG 1.005   URINEPH 6.0   Few bacteria  Rare WBC's    ASSESSMENT/PLAN:   Rosario was seen today for physical, urinary pain and contraception.    Diagnoses and all orders for this visit:    Routine general medical examination at a health care facility  Age-appropriate preventative health maintenance along with diet, exercise and healthy weight discussed. Plans to get flu shot next month. Pap due this year - normal in 2019.     Lower urinary tract symptoms (LUTS)  -     Urinalysis ([facility])  -     Urine Culture  Routine ([facility])  -     nitroFURantoin macrocrystal-monohydrate (MACROBID) 100 MG capsule; Take 1 capsule (100 mg) by mouth 2 times daily for 5 days  -     Wet Prep ([facility])  UA overall reassuring today and does not have symptoms today. Printed prescription for Macrobid for her to fill if symptoms return. Urine culture sent.    Screening for malignant neoplasm of cervix  -     Pap IG LB Rfx HPV ASCU ([facility])        Patient has been advised of split billing requirements and indicates understanding: Yes  COUNSELING:   Reviewed preventive health counseling, as reflected in patient instructions    Estimated body mass index is 19.68 kg/m  as calculated from the following:    Height as of this encounter: 1.556 m (5' 1.25\").    Weight as of this encounter: 47.6 kg (105 lb).    She reports that she has never smoked. She has never used smokeless tobacco.    CHEIKH Cesar [facility]  "

## 2022-10-02 LAB
ANTIMICROBIAL SUSCEPTIBILITY: ABNORMAL
Lab: ABNORMAL
URINE CULTURE: ABNORMAL

## 2022-10-04 LAB
.: NORMAL
.: NORMAL
DIAGNOSIS:: NORMAL
Lab: NORMAL
PERFORMED BY:: NORMAL
SOURCE: NORMAL
SPECIMEN ADEQUACY:: NORMAL
TEST METHODOLOGY:: NORMAL

## 2023-12-16 ENCOUNTER — HEALTH MAINTENANCE LETTER (OUTPATIENT)
Age: 28
End: 2023-12-16

## 2024-12-02 SDOH — HEALTH STABILITY: PHYSICAL HEALTH: ON AVERAGE, HOW MANY DAYS PER WEEK DO YOU ENGAGE IN MODERATE TO STRENUOUS EXERCISE (LIKE A BRISK WALK)?: 3 DAYS

## 2024-12-02 SDOH — HEALTH STABILITY: PHYSICAL HEALTH: ON AVERAGE, HOW MANY MINUTES DO YOU ENGAGE IN EXERCISE AT THIS LEVEL?: 60 MIN

## 2024-12-02 ASSESSMENT — SOCIAL DETERMINANTS OF HEALTH (SDOH): HOW OFTEN DO YOU GET TOGETHER WITH FRIENDS OR RELATIVES?: ONCE A WEEK

## 2024-12-03 ENCOUNTER — OFFICE VISIT (OUTPATIENT)
Dept: FAMILY MEDICINE | Facility: CLINIC | Age: 29
End: 2024-12-03

## 2024-12-03 VITALS
SYSTOLIC BLOOD PRESSURE: 119 MMHG | OXYGEN SATURATION: 100 % | WEIGHT: 107 LBS | HEIGHT: 62 IN | BODY MASS INDEX: 19.69 KG/M2 | HEART RATE: 54 BPM | DIASTOLIC BLOOD PRESSURE: 76 MMHG

## 2024-12-03 DIAGNOSIS — Z13.6 SCREENING FOR CARDIOVASCULAR CONDITION: ICD-10-CM

## 2024-12-03 DIAGNOSIS — Z00.00 ROUTINE GENERAL MEDICAL EXAMINATION AT A HEALTH CARE FACILITY: Primary | ICD-10-CM

## 2024-12-03 DIAGNOSIS — L84 CORN OR CALLUS: ICD-10-CM

## 2024-12-03 DIAGNOSIS — Z97.5 IUD (INTRAUTERINE DEVICE) IN PLACE: ICD-10-CM

## 2024-12-03 DIAGNOSIS — Z23 COVID-19 VACCINE ADMINISTERED: ICD-10-CM

## 2024-12-03 DIAGNOSIS — I73.00 RAYNAUD'S DISEASE WITHOUT GANGRENE: ICD-10-CM

## 2024-12-03 DIAGNOSIS — Z13.1 SCREENING FOR DIABETES MELLITUS: ICD-10-CM

## 2024-12-03 LAB
ANION GAP SERPL CALCULATED.3IONS-SCNC: 8 MMOL/L (ref 7–15)
BUN SERPL-MCNC: 11.2 MG/DL (ref 6–20)
CALCIUM SERPL-MCNC: 9.3 MG/DL (ref 8.8–10.4)
CHLORIDE SERPL-SCNC: 105 MMOL/L (ref 98–107)
CHOLEST SERPL-MCNC: 146 MG/DL
CREAT SERPL-MCNC: 0.78 MG/DL (ref 0.51–0.95)
EGFRCR SERPLBLD CKD-EPI 2021: >90 ML/MIN/1.73M2
FASTING STATUS PATIENT QL REPORTED: YES
FASTING STATUS PATIENT QL REPORTED: YES
GLUCOSE SERPL-MCNC: 86 MG/DL (ref 70–99)
HCO3 SERPL-SCNC: 27 MMOL/L (ref 22–29)
HDLC SERPL-MCNC: 64 MG/DL
LDLC SERPL CALC-MCNC: 74 MG/DL
NONHDLC SERPL-MCNC: 82 MG/DL
POTASSIUM SERPL-SCNC: 3.8 MMOL/L (ref 3.4–5.3)
SODIUM SERPL-SCNC: 140 MMOL/L (ref 135–145)
TRIGL SERPL-MCNC: 38 MG/DL

## 2024-12-03 PROCEDURE — 90480 ADMN SARSCOV2 VAC 1/ONLY CMP: CPT

## 2024-12-03 PROCEDURE — 82465 ASSAY BLD/SERUM CHOLESTEROL: CPT

## 2024-12-03 PROCEDURE — 36415 COLL VENOUS BLD VENIPUNCTURE: CPT

## 2024-12-03 PROCEDURE — 99395 PREV VISIT EST AGE 18-39: CPT | Mod: 25

## 2024-12-03 PROCEDURE — 80048 BASIC METABOLIC PNL TOTAL CA: CPT | Mod: 90

## 2024-12-03 PROCEDURE — 80048 BASIC METABOLIC PNL TOTAL CA: CPT

## 2024-12-03 PROCEDURE — 91320 SARSCV2 VAC 30MCG TRS-SUC IM: CPT

## 2024-12-03 PROCEDURE — 80061 LIPID PANEL: CPT | Mod: QW

## 2024-12-03 NOTE — PATIENT INSTRUCTIONS
Patient Education   Preventive Care Advice   This is general advice given by our system to help you stay healthy. However, your care team may have specific advice just for you. Please talk to your care team about your preventive care needs.  Nutrition  Eat 5 or more servings of fruits and vegetables each day.  Try wheat bread, brown rice and whole grain pasta (instead of white bread, rice, and pasta).  Get enough calcium and vitamin D. Check the label on foods and aim for 100% of the RDA (recommended daily allowance).  Lifestyle  Exercise at least 150 minutes each week  (30 minutes a day, 5 days a week).  Do muscle strengthening activities 2 days a week. These help control your weight and prevent disease.  No smoking.  Wear sunscreen to prevent skin cancer.  Have a dental exam and cleaning every 6 months.  Yearly exams  See your health care team every year to talk about:  Any changes in your health.  Any medicines your care team has prescribed.  Preventive care, family planning, and ways to prevent chronic diseases.  Shots (vaccines)   HPV shots (up to age 26), if you've never had them before.  Hepatitis B shots (up to age 59), if you've never had them before.  COVID-19 shot: Get this shot when it's due.  Flu shot: Get a flu shot every year.  Tetanus shot: Get a tetanus shot every 10 years.  Pneumococcal, hepatitis A, and RSV shots: Ask your care team if you need these based on your risk.  Shingles shot (for age 50 and up)  General health tests  Diabetes screening:  Starting at age 35, Get screened for diabetes at least every 3 years.  If you are younger than age 35, ask your care team if you should be screened for diabetes.  Cholesterol test: At age 39, start having a cholesterol test every 5 years, or more often if advised.  Bone density scan (DEXA): At age 50, ask your care team if you should have this scan for osteoporosis (brittle bones).  Hepatitis C: Get tested at least once in your life.  STIs (sexually  transmitted infections)  Before age 24: Ask your care team if you should be screened for STIs.  After age 24: Get screened for STIs if you're at risk. You are at risk for STIs (including HIV) if:  You are sexually active with more than one person.  You don't use condoms every time.  You or a partner was diagnosed with a sexually transmitted infection.  If you are at risk for HIV, ask about PrEP medicine to prevent HIV.  Get tested for HIV at least once in your life, whether you are at risk for HIV or not.  Cancer screening tests  Cervical cancer screening: If you have a cervix, begin getting regular cervical cancer screening tests starting at age 21.  Breast cancer scan (mammogram): If you've ever had breasts, begin having regular mammograms starting at age 40. This is a scan to check for breast cancer.  Colon cancer screening: It is important to start screening for colon cancer at age 45.  Have a colonoscopy test every 10 years (or more often if you're at risk) Or, ask your provider about stool tests like a FIT test every year or Cologuard test every 3 years.  To learn more about your testing options, visit:   .  For help making a decision, visit:   https://bit.ly/uu65824.  Prostate cancer screening test: If you have a prostate, ask your care team if a prostate cancer screening test (PSA) at age 55 is right for you.  Lung cancer screening: If you are a current or former smoker ages 50 to 80, ask your care team if ongoing lung cancer screenings are right for you.  For informational purposes only. Not to replace the advice of your health care provider. Copyright   2023 [facility]. All rights reserved. Clinically reviewed by the [facility]. [employer] 197685 - REV 01/24.  Learning About Stress  What is stress?     Stress is your body's response to a hard situation. Your body can have a physical, emotional, or mental response. Stress is a fact of life for most people, and it  affects everyone differently. What causes stress for you may not be stressful for someone else.  A lot of things can cause stress. You may feel stress when you go on a job interview, take a test, or run a race. This kind of short-term stress is normal and even useful. It can help you if you need to work hard or react quickly. For example, stress can help you finish an important job on time.  Long-term stress is caused by ongoing stressful situations or events. Examples of long-term stress include long-term health problems, ongoing problems at work, or conflicts in your family. Long-term stress can harm your health.  How does stress affect your health?  When you are stressed, your body responds as though you are in danger. It makes hormones that speed up your heart, make you breathe faster, and give you a burst of energy. This is called the fight-or-flight stress response. If the stress is over quickly, your body goes back to normal and no harm is done.  But if stress happens too often or lasts too long, it can have bad effects. Long-term stress can make you more likely to get sick, and it can make symptoms of some diseases worse. If you tense up when you are stressed, you may develop neck, shoulder, or low back pain. Stress is linked to high blood pressure and heart disease.  Stress also harms your emotional health. It can make you peterson, tense, or depressed. Your relationships may suffer, and you may not do well at work or school.  What can you do to manage stress?  You can try these things to help manage stress:   Do something active. Exercise or activity can help reduce stress. Walking is a great way to get started. Even everyday activities such as housecleaning or yard work can help.  Try yoga or neftaly chi. These techniques combine exercise and meditation. You may need some training at first to learn them.  Do something you enjoy. For example, listen to music or go to a movie. Practice your hobby or do volunteer  "work.  Meditate. This can help you relax, because you are not worrying about what happened before or what may happen in the future.  Do guided imagery. Imagine yourself in any setting that helps you feel calm. You can use online videos, books, or a teacher to guide you.  Do breathing exercises. For example:  From a standing position, bend forward from the waist with your knees slightly bent. Let your arms dangle close to the floor.  Breathe in slowly and deeply as you return to a standing position. Roll up slowly and lift your head last.  Hold your breath for just a few seconds in the standing position.  Breathe out slowly and bend forward from the waist.  Let your feelings out. Talk, laugh, cry, and express anger when you need to. Talking with supportive friends or family, a counselor, or a nani leader about your feelings is a healthy way to relieve stress. Avoid discussing your feelings with people who make you feel worse.  Write. It may help to write about things that are bothering you. This helps you find out how much stress you feel and what is causing it. When you know this, you can find better ways to cope.  What can you do to prevent stress?  You might try some of these things to help prevent stress:  Manage your time. This helps you find time to do the things you want and need to do.  Get enough sleep. Your body recovers from the stresses of the day while you are sleeping.  Get support. Your family, friends, and community can make a difference in how you experience stress.  Limit your news feed. Avoid or limit time on social media or news that may make you feel stressed.  Do something active. Exercise or activity can help reduce stress. Walking is a great way to get started.  Where can you learn more?  Go to https://www.[employer].net/patiented  Enter N032 in the search box to learn more about \"Learning About Stress.\"  Current as of: October 24, 2023  Content Version: 14.2 2024 [employer]. "   Care instructions adapted under license by your healthcare professional. If you have questions about a medical condition or this instruction, always ask your healthcare professional. [employer] disclaims any warranty or liability for your use of this information.    Substance Use Disorder: Care Instructions  Overview     You can improve your life and health by stopping your use of alcohol or drugs. When you don't drink or use drugs, you may feel and sleep better. You may get along better with your family, friends, and coworkers. There are medicines and programs that can help with substance use disorder.  How can you care for yourself at home?  Here are some ways to help you stay sober and prevent relapse.  If you have been given medicine to help keep you sober or reduce your cravings, be sure to take it exactly as prescribed.  Talk to your doctor about programs that can help you stop using drugs or drinking alcohol.  Do not keep alcohol or drugs in your home.  Plan ahead. Think about what you'll say if other people ask you to drink or use drugs. Try not to spend time with people who drink or use drugs.  Use the time and money spent on drinking or drugs to do something that's important to you.  Preventing a relapse  Have a plan to deal with relapse. Learn to recognize changes in your thinking that lead you to drink or use drugs. Get help before you start to drink or use drugs again.  Try to stay away from situations, friends, or places that may lead you to drink or use drugs.  If you feel the need to drink alcohol or use drugs again, seek help right away. Call a trusted friend or family member. Some people get support from organizations such as Narcotics Anonymous or [employer] or from treatment facilities.  If you relapse, get help as soon as you can. Some people make a plan with another person that outlines what they want that person to do for them if they relapse. The plan usually includes how  to handle the relapse and who to notify in case of relapse.  Don't give up. Remember that a relapse doesn't mean that you have failed. Use the experience to learn the triggers that lead you to drink or use drugs. Then quit again. Recovery is a lifelong process. Many people have several relapses before they are able to quit for good.  Follow-up care is a key part of your treatment and safety. Be sure to make and go to all appointments, and call your doctor if you are having problems. It's also a good idea to know your test results and keep a list of the medicines you take.  When should you call for help?   Call 911  anytime you think you may need emergency care. For example, call if you or someone else:    Has overdosed or has withdrawal signs. Be sure to tell the emergency workers that you are or someone else is using or trying to quit using drugs. Overdose or withdrawal signs may include:  Losing consciousness.  Seizure.  Seeing or hearing things that aren't there (hallucinations).     Is thinking or talking about suicide or harming others.   Where to get help 24 hours a day, 7 days a week   If you or someone you know talks about suicide, self-harm, a mental health crisis, a substance use crisis, or any other kind of emotional distress, get help right away. You can:    Call the Suicide and Crisis Lifeline at 484.     Call 1-427-547-TALK (1-526.848.3052).     Text HOME to 382416 to access the Crisis Text Line.   Consider saving these numbers in your phone.  Go to [employer] for more information or to chat online.  Call your doctor now or seek immediate medical care if:    You are having withdrawal symptoms. These may include nausea or vomiting, sweating, shakiness, and anxiety.   Watch closely for changes in your health, and be sure to contact your doctor if:    You have a relapse.     You need more help or support to stop.   Where can you learn more?  Go to https://www.healthwise.net/patiented  Enter H573 in  "the search box to learn more about \"Substance Use Disorder: Care Instructions.\"  Current as of: November 15, 2023  Content Version: 14.2 2024 [facility] [employer].   Care instructions adapted under license by your healthcare professional. If you have questions about a medical condition or this instruction, always ask your healthcare professional. [employer] disclaims any warranty or liability for your use of this information.       "

## 2024-12-03 NOTE — PROGRESS NOTES
Preventive Care Visit  [facility]  CHEIKH Presley CNP, Family Medicine  Dec 3, 2024      Assessment & Plan     Routine general medical examination at a health care facility  Age-appropriate preventative health maintenance along with diet, exercise and healthy weight discussed.      Raynaud's disease without gangrene  Stable. Continue current plan.     Corn or callus  Reviewed diagnosis. Will trial OTC medications. Discussed follow up for cryotherapy if no improvement in symptoms. Red flags that warrant emergent evaluation discussed. Patient agreeable to plan. All questions answered.      IUD (intrauterine device) in place  Placed 11/20/23.     Screening for diabetes mellitus  - VENOUS COLLECTION  - Basic metabolic panel  - Basic metabolic panel    Screening for cardiovascular condition  - VENOUS COLLECTION  - Lipid Profile  - Lipid Profile    COVID-19 vaccine administered  - COVID-19 12+ (PFIZER)      Patient has been advised of split billing requirements and indicates understanding: Yes        Counseling  Appropriate preventive services were addressed with this patient via screening, questionnaire, or discussion as appropriate for fall prevention, nutrition, physical activity, Tobacco-use cessation, social engagement, weight loss and cognition.  Checklist reviewing preventive services available has been given to the patient.  Reviewed patient's diet, addressing concerns and/or questions.   She is at risk for lack of exercise and has been provided with information to increase physical activity for the benefit of her well-being.   She is at risk for psychosocial distress and has been provided with information to reduce risk.       See Patient Instructions    Return in about 53 weeks (around 12/9/2025) for Annual Wellness Visit.    Suly Ponce is a 29 year old, presenting for the following:  Physical (Fasting, no concerns) and Health Maintenance (PAP: UTD - 09/2022 (Next: 2025)/Vaccines: Covid  and flu due)           HPI    Raynaud's: Keeping warm-heated socks.     Corn: both feet following pregnancy     Jian Maintenance  Pap: UTD last in 2022, due in 2025, IUD in place on 11/20/23  Vaccines: Had flu done at pediatrician's office      Health Care Directive  Patient does not have a Health Care Directive: Patient states has Advance Directive and will bring in a copy to clinic.      12/2/2024   General Health   How would you rate your overall physical health? Good   Feel stress (tense, anxious, or unable to sleep) To some extent      (!) STRESS CONCERN      12/2/2024   Nutrition   Three or more servings of calcium each day? Yes   Diet: Regular (no restrictions)   How many servings of fruit and vegetables per day? (!) 2-3   How many sweetened beverages each day? 0-1            12/2/2024   Exercise   Days per week of moderate/strenous exercise 3 days   Average minutes spent exercising at this level 60 min            12/2/2024   Social Factors   Frequency of gathering with friends or relatives Once a week   Worry food won't last until get money to buy more No   Food not last or not have enough money for food? No   Do you have housing? (Housing is defined as stable permanent housing and does not include staying ouside in a car, in a tent, in an abandoned building, in an overnight shelter, or couch-surfing.) Yes   Are you worried about losing your housing? No   Lack of transportation? No   Unable to get utilities (heat,electricity)? No            12/2/2024   Dental   Dentist two times every year? Yes            12/2/2024   TB Screening   Were you born outside of the [place]? No              Today's PHQ-2 Score:       9/29/2022    10:08 AM   PHQ-2 ( 1999 [employer])   Q1: Little interest or pleasure in doing things 1   Q2: Feeling down, depressed or hopeless 0   PHQ-2 Score 1         12/2/2024   Substance Use   Alcohol more than 3/day or more than 7/wk No   Do you use any other substances recreationally? (!) CANNABIS  "PRODUCTS        Social History     Tobacco Use    Smoking status: Never    Smokeless tobacco: Never   Substance Use Topics    Alcohol use: Yes     Comment: weekly/socially    Drug use: Yes     Types: Marijuana             2024   Breast Cancer Screening   Family history of breast, colon, or ovarian cancer? No / Unknown         Mammogram Screening - Patient under 40 years of age: Routine Mammogram Screening not recommended.         2024   STI Screening   New sexual partner(s) since last STI/HIV test? No        History of abnormal Pap smear: No - age 30- 64 PAP with HPV every 5 years recommended             2024   Contraception/Family Planning   Questions about contraception or family planning No           Reviewed and updated as needed this visit by Provider   Tobacco  Allergies  Meds  Problems  Med Hx  Surg Hx  Fam Hx            Past Medical History:   Diagnosis Date     (normal spontaneous vaginal delivery) 2021     Past Surgical History:   Procedure Laterality Date    NO HISTORY OF SURGERY       Lab work is in process      Review of Systems  Constitutional, HEENT, cardiovascular, pulmonary, GI, , musculoskeletal, neuro, skin, endocrine and psych systems are negative, except as otherwise noted.     Objective    Exam  /76   Pulse 54   Ht 1.562 m (5' 1.5\")   Wt 48.5 kg (107 lb)   SpO2 100%   BMI 19.89 kg/m     Estimated body mass index is 19.89 kg/m  as calculated from the following:    Height as of this encounter: 1.562 m (5' 1.5\").    Weight as of this encounter: 48.5 kg (107 lb).    Physical Exam  GENERAL: alert and no distress  EYES: Eyes grossly normal to inspection, PERRL and conjunctivae and sclerae normal  HENT: ear canals and TM's normal, nose and mouth without ulcers or lesions  NECK: no adenopathy, no asymmetry, masses, or scars  RESP: lungs clear to auscultation - no rales, rhonchi or wheezes  BREAST: normal without masses, tenderness or nipple discharge and no " palpable axillary masses or adenopathy  CV: regular rate and rhythm, normal S1 S2, no S3 or S4, no murmur, click or rub, no peripheral edema  ABDOMEN: soft, nontender, no hepatosplenomegaly, no masses and bowel sounds normal  MS: no gross musculoskeletal defects noted, no edema  SKIN: no suspicious lesions or rashes  NEURO: Normal strength and tone, mentation intact and speech normal  PSYCH: mentation appears normal, affect normal/bright        Signed Electronically by: CHEIKH Presley CNP